# Patient Record
(demographics unavailable — no encounter records)

---

## 2025-01-28 NOTE — HISTORY OF PRESENT ILLNESS
[FreeTextEntry1] : 27 yo gentleman with c/o red area on foreskin (circumcised), irritated during sex or masturbation, which started in November 2024, became more noticeable. Seen at urgent care and told he had a yeast infection, treated with nystatin, followed by doxycycline and steroid cream. Then treated with oral fluconazole and triamcinolone. None of these treatments helped.   As a result, he has been refraining from sex, but has noticed semen more yellow than usual. He also feels like he is less stimulated sexually because of this discomfort.

## 2025-01-28 NOTE — ASSESSMENT
[FreeTextEntry1] :   Impression/plan: 20-year-old gentleman with mild balanitis and concern for low libido likely secondary to just discomfort in general 1.  Trial of betamethasone cream for 2 weeks.  Follow-up with his dermatologist. 2.  Testosterone free and total ordered.

## 2025-01-28 NOTE — PHYSICAL EXAM
[General Appearance - Well Developed] : well developed [General Appearance - Well Nourished] : well nourished [Normal Appearance] : normal appearance [Well Groomed] : well groomed [General Appearance - In No Acute Distress] : no acute distress [] : no respiratory distress [Respiration, Rhythm And Depth] : normal respiratory rhythm and effort [Exaggerated Use Of Accessory Muscles For Inspiration] : no accessory muscle use [de-identified] : minimal irritation at right distal shaft foreskin [Chaperone Present] : A chaperone was present in the examining room during all aspects of the physical examination [FreeTextEntry2] : Winnie

## 2025-02-26 NOTE — HISTORY OF PRESENT ILLNESS
[FreeTextEntry1] : 27 yo gentleman with c/o red area on foreskin (circumcised), irritated during sex or masturbation, which started in November 2024, became more noticeable. Seen at urgent care and told he had a yeast infection, treated with nystatin, followed by doxycycline and steroid cream. Then treated with oral fluconazole and triamcinolone. None of these treatments helped.  As a result, he has been refraining from sex, but has noticed semen more yellow than usual. He also feels like he is less stimulated sexually because of this discomfort.   2/25/25  28-year-old gentleman with mild balanitis and concern for low libido here for follow up. He used Betamethasone cream x 2 weeks as prescribed. Saw dermatology who recommended continuing to use Betamethasone for an additional week. He saw some improvement but noticed area irritated by urination and intercourse. He also feels skin stiffness on penis when flaccid. Testosterone testing ordered at last visit. He has not done it as yet.   PE: No redness, excoriation, stiffness on exam.   Plan:  Physical exam findings discussed Testosterone testing as planned Recommend follow up with dermatology  I, Dr. Garo Gamez, personally performed the evaluation and management (E/M) services for this patient.  That E/M includes conducting the clinically appropriate initial history &/or exam, assessing all conditions, and establishing the plan of care.  Today, my LARISSA Sheba, was here to observe &/or participate in the visit & follow plan of care established by me.

## 2025-02-26 NOTE — PHYSICAL EXAM
[General Appearance - Well Developed] : well developed [General Appearance - Well Nourished] : well nourished [Normal Appearance] : normal appearance [Well Groomed] : well groomed [] : no respiratory distress [General Appearance - In No Acute Distress] : no acute distress [Exaggerated Use Of Accessory Muscles For Inspiration] : no accessory muscle use [Normal Station and Gait] : the gait and station were normal for the patient's age [Skin Color & Pigmentation] : normal skin color and pigmentation [No Focal Deficits] : no focal deficits [Oriented To Time, Place, And Person] : oriented to person, place, and time [Affect] : the affect was normal [Mood] : the mood was normal [Not Anxious] : not anxious [Chaperone Present] : A chaperone was present in the examining room during all aspects of the physical examination [FreeTextEntry2] : Sheba Prescott NP

## 2025-07-03 NOTE — DISCUSSION/SUMMARY
[FreeTextEntry1] : CP/abnormal ekg check echo if able to approve and schedule. EKG SB with incomplete RBBB Recommend a  and a primary care consult.  [EKG obtained to assist in diagnosis and management of assessed problem(s)] : EKG obtained to assist in diagnosis and management of assessed problem(s)

## 2025-07-03 NOTE — REASON FOR VISIT
[Symptom and Test Evaluation] : symptom and test evaluation [FreeTextEntry1] : 28-year-old male no significant past medical history presents with left-sided chest pain radiating to shoulder and left arm which started yesterday. Pain is worse with deep inspiration. No exertional symptoms. No shortness of breath. Went to urgent care and was sent to the ED for further evaluation. Urgent care was concerned with his ekg. No prior cardiac testing.  He has been a bit out of sorts lately secondary to a possible  concern.  He is also a smoker.